# Patient Record
Sex: MALE | URBAN - METROPOLITAN AREA
[De-identification: names, ages, dates, MRNs, and addresses within clinical notes are randomized per-mention and may not be internally consistent; named-entity substitution may affect disease eponyms.]

---

## 2024-08-05 ENCOUNTER — APPOINTMENT (RX ONLY)
Dept: URBAN - METROPOLITAN AREA CLINIC 11 | Facility: CLINIC | Age: 29
Setting detail: DERMATOLOGY
End: 2024-08-05

## 2024-08-05 DIAGNOSIS — Z41.1 ENCOUNTER FOR COSMETIC SURGERY: ICD-10-CM

## 2024-08-05 DIAGNOSIS — Z72.0 TOBACCO USE: ICD-10-CM

## 2024-08-05 PROCEDURE — ? CONSULTATION - CHEST (COSMETIC)

## 2024-08-05 PROCEDURE — 99003: CPT

## 2024-08-05 PROCEDURE — ? CONSULTATION - ABDOMINOPLASTY

## 2024-08-05 PROCEDURE — ? SMOKING CESSATION COUNSELING

## 2024-08-05 ASSESSMENT — LOCATION ZONE DERM: LOCATION ZONE: TRUNK

## 2024-08-05 ASSESSMENT — LOCATION SIMPLE DESCRIPTION DERM
LOCATION SIMPLE: ABDOMEN
LOCATION SIMPLE: CHEST

## 2024-08-05 ASSESSMENT — LOCATION DETAILED DESCRIPTION DERM
LOCATION DETAILED: EPIGASTRIC SKIN
LOCATION DETAILED: LEFT LATERAL INFERIOR CHEST
LOCATION DETAILED: RIGHT LATERAL INFERIOR CHEST

## 2024-08-05 NOTE — PROCEDURE: CONSULTATION - ABDOMINOPLASTY
Other Plan: Extended Abdominoplasty
Detail Level: Simple
Consultation Charge $ (Use Numbers Only, No Text Please.): 100
Count Minor/Major Decisions Toward Mdm (Not Cosmetic)?: No

## 2024-08-05 NOTE — PROCEDURE: CONSULTATION - CHEST (COSMETIC)
Count Minor/Major Decisions Toward Mdm (Not Cosmetic)?: No
Other Plan - Left: modified lateral mastopexy
Detail Level: Simple

## 2024-10-21 ENCOUNTER — APPOINTMENT (RX ONLY)
Dept: URBAN - METROPOLITAN AREA CLINIC 11 | Facility: CLINIC | Age: 29
Setting detail: DERMATOLOGY
End: 2024-10-21

## 2024-10-21 DIAGNOSIS — Z41.1 ENCOUNTER FOR COSMETIC SURGERY: ICD-10-CM

## 2024-10-21 DIAGNOSIS — Z72.0 TOBACCO USE: ICD-10-CM

## 2024-10-21 PROCEDURE — ? CONSULTATION - CHEST (COSMETIC)

## 2024-10-21 PROCEDURE — ? CONSULTATION - ABDOMINOPLASTY

## 2024-10-21 PROCEDURE — ? SMOKING CESSATION COUNSELING

## 2024-10-21 PROCEDURE — 99005: CPT

## 2024-10-21 ASSESSMENT — LOCATION SIMPLE DESCRIPTION DERM
LOCATION SIMPLE: CHEST
LOCATION SIMPLE: ABDOMEN

## 2024-10-21 ASSESSMENT — LOCATION ZONE DERM: LOCATION ZONE: TRUNK

## 2024-10-21 ASSESSMENT — LOCATION DETAILED DESCRIPTION DERM
LOCATION DETAILED: EPIGASTRIC SKIN
LOCATION DETAILED: RIGHT LATERAL INFERIOR CHEST
LOCATION DETAILED: LEFT LATERAL INFERIOR CHEST

## 2024-10-21 NOTE — PROCEDURE: CONSULTATION - ABDOMINOPLASTY
Other Plan: Extended Abdominoplasty, possible circumferential  The issues of wound healing with Belt versus bunching of the sacral tissues have been discussed with the patient.
Detail Level: Simple
Count Minor/Major Decisions Toward Mdm (Not Cosmetic)?: No

## 2024-11-25 ENCOUNTER — APPOINTMENT (RX ONLY)
Dept: URBAN - METROPOLITAN AREA CLINIC 11 | Facility: CLINIC | Age: 29
Setting detail: DERMATOLOGY
End: 2024-11-25

## 2024-11-25 DIAGNOSIS — Z41.9 ENCOUNTER FOR PROCEDURE FOR PURPOSES OTHER THAN REMEDYING HEALTH STATE, UNSPECIFIED: ICD-10-CM

## 2024-11-25 PROCEDURE — Z1101: HCPCS

## 2024-11-25 PROCEDURE — 99004: CPT

## 2024-11-25 PROCEDURE — ? PRE-OP WORKLIST

## 2024-11-25 PROCEDURE — ? PRESCRIPTION

## 2024-11-25 RX ORDER — GABAPENTIN 300 MG/1
CAPSULE ORAL BID
Qty: 11 | Refills: 0 | Status: ERX | COMMUNITY
Start: 2024-11-25

## 2024-11-25 RX ORDER — CEPHALEXIN 500 MG/1
CAPSULE ORAL TID
Qty: 21 | Refills: 0 | Status: ERX | COMMUNITY
Start: 2024-11-25

## 2024-11-25 RX ORDER — MELOXICAM 7.5 MG/1
TABLET ORAL BID
Qty: 15 | Refills: 0 | Status: ERX | COMMUNITY
Start: 2024-11-25

## 2024-11-25 RX ORDER — DIAZEPAM 5 MG/1
TABLET ORAL AS DIRECTED
Qty: 15 | Refills: 0 | Status: ERX | COMMUNITY
Start: 2024-11-25

## 2024-11-25 RX ORDER — ONDANSETRON 8 MG/1
TABLET, ORALLY DISINTEGRATING ORAL
Qty: 15 | Refills: 0 | Status: ERX | COMMUNITY
Start: 2024-11-25

## 2024-11-25 RX ORDER — DOCUSATE SODIUM 100 MG/1
CAPSULE, LIQUID FILLED ORAL BID
Qty: 14 | Refills: 0 | Status: ERX | COMMUNITY
Start: 2024-11-25

## 2024-11-25 RX ORDER — OXYCODONE HYDROCHLORIDE 5 MG/1
TABLET ORAL
Qty: 20 | Refills: 0 | Status: ERX | COMMUNITY
Start: 2024-11-25

## 2024-11-25 RX ADMIN — DIAZEPAM: 5 TABLET ORAL at 00:00

## 2024-11-25 RX ADMIN — MELOXICAM: 7.5 TABLET ORAL at 00:00

## 2024-11-25 RX ADMIN — DOCUSATE SODIUM: 100 CAPSULE, LIQUID FILLED ORAL at 00:00

## 2024-11-25 RX ADMIN — GABAPENTIN: 300 CAPSULE ORAL at 00:00

## 2024-11-25 RX ADMIN — CEPHALEXIN: 500 CAPSULE ORAL at 00:00

## 2024-11-25 RX ADMIN — OXYCODONE HYDROCHLORIDE: 5 TABLET ORAL at 00:00

## 2024-11-25 RX ADMIN — ONDANSETRON: 8 TABLET, ORALLY DISINTEGRATING ORAL at 00:00

## 2024-11-25 ASSESSMENT — LOCATION ZONE DERM: LOCATION ZONE: TRUNK

## 2024-11-25 ASSESSMENT — LOCATION DETAILED DESCRIPTION DERM
LOCATION DETAILED: RIGHT INFERIOR LATERAL LOWER BACK
LOCATION DETAILED: PERIUMBILICAL SKIN
LOCATION DETAILED: RIGHT LATERAL INFERIOR CHEST
LOCATION DETAILED: LEFT INFERIOR LATERAL LOWER BACK
LOCATION DETAILED: LEFT LATERAL INFERIOR CHEST

## 2024-11-25 ASSESSMENT — LOCATION SIMPLE DESCRIPTION DERM
LOCATION SIMPLE: LEFT LOWER BACK
LOCATION SIMPLE: ABDOMEN
LOCATION SIMPLE: CHEST
LOCATION SIMPLE: RIGHT LOWER BACK

## 2024-11-25 NOTE — HPI: PREOPERATIVE APPOINTMENT
[email or web address]_arrangements_have_been_made
Has The Patient Completed Informed Consent?: is scheduled to complete informed consent documentation during this visit
Has The Patient Fulfilled Financial Responsibilities For Surgical Scheduling?: is scheduled to complete payment to fulfill financial responsibilities during this visit
Date Of Procedure?: 12/05/2024
Facility: [facility]
Surgeon: Dr. Hamm

## 2024-11-25 NOTE — PROCEDURE: PRE-OP WORKLIST
Detail Level: Simple
Photos Taken?: yes
Surgeon: Dr. Hamm
Surgery Scheduled: extended abdominoplasty with liposuction, modified lateral mastopexy with liposuction
Admission Status: outpatient
Date Of Surgery: 12/05/2024

## 2024-12-05 ENCOUNTER — APPOINTMENT (RX ONLY)
Dept: URBAN - METROPOLITAN AREA CLINIC 181 | Facility: CLINIC | Age: 29
Setting detail: DERMATOLOGY
End: 2024-12-05

## 2024-12-05 DIAGNOSIS — Z41.1 ENCOUNTER FOR COSMETIC SURGERY: ICD-10-CM

## 2024-12-05 PROCEDURE — 15847 EXC SKIN ABD ADD-ON: CPT

## 2024-12-05 PROCEDURE — ? SET GLOBAL PERIOD

## 2024-12-05 PROCEDURE — 19318 BREAST REDUCTION: CPT | Mod: 50

## 2024-12-05 ASSESSMENT — LOCATION ZONE DERM: LOCATION ZONE: TRUNK

## 2024-12-05 ASSESSMENT — LOCATION DETAILED DESCRIPTION DERM: LOCATION DETAILED: PERIUMBILICAL SKIN

## 2024-12-05 ASSESSMENT — LOCATION SIMPLE DESCRIPTION DERM: LOCATION SIMPLE: ABDOMEN

## 2024-12-05 NOTE — PROCEDURE: SET GLOBAL PERIOD
Detail Level: Detailed
Other Surgery Performed: liposcution  abdominoplasty
Date Of Surgery (Mm/Dd/Yyyy): 12/05/2024
Surgery Global Period: 0

## 2024-12-06 ENCOUNTER — APPOINTMENT (RX ONLY)
Dept: URBAN - METROPOLITAN AREA CLINIC 11 | Facility: CLINIC | Age: 29
Setting detail: DERMATOLOGY
End: 2024-12-06

## 2024-12-06 DIAGNOSIS — Z48.89 ENCOUNTER FOR OTHER SPECIFIED SURGICAL AFTERCARE: ICD-10-CM

## 2024-12-06 PROCEDURE — ? CODE 99024 - NO CHARGE POST-OP VISIT

## 2024-12-06 PROCEDURE — 99024 POSTOP FOLLOW-UP VISIT: CPT

## 2024-12-06 PROCEDURE — ? PHOTOS OBTAINED

## 2024-12-06 PROCEDURE — ? COUNSELING - POST-OP CHECK

## 2024-12-06 PROCEDURE — ? COUNSELING - POST-OP CHECK, LIPOSUCTION

## 2024-12-06 PROCEDURE — ? COUNSELING - POST-OP CHECK, ABDOMINOPLASTY

## 2024-12-06 ASSESSMENT — LOCATION DETAILED DESCRIPTION DERM
LOCATION DETAILED: PERIUMBILICAL SKIN
LOCATION DETAILED: LEFT LATERAL INFERIOR CHEST
LOCATION DETAILED: RIGHT LATERAL INFERIOR CHEST

## 2024-12-06 ASSESSMENT — LOCATION SIMPLE DESCRIPTION DERM
LOCATION SIMPLE: ABDOMEN
LOCATION SIMPLE: CHEST

## 2024-12-06 ASSESSMENT — LOCATION ZONE DERM: LOCATION ZONE: TRUNK

## 2024-12-06 NOTE — HPI: POST-OP CHECK, COSMETIC (EXTENDED)
What Best Describes The Level Of Symmetry? (Check All That Apply): good
How Severe Is Your Pain?: moderate
How Is Your Wound Healing?: fresh
Compared To The Last Evaluation, How Have The Findings Changed?: stable
Date Of Procedure: 12/5/2024

## 2024-12-06 NOTE — PROCEDURE: PHOTOS OBTAINED
Detail Level: Simple
Follow-Up Increment: 1
Photographed Locations: Photos were obtained.
Follow-Up Interval: week
Follow-Up For Additional Photos?: no

## 2024-12-06 NOTE — PROCEDURE: COUNSELING - POST-OP CHECK, ABDOMINOPLASTY
Add Postop Global No-Charge Code (37813)?: yes
Detail Level: Simple
Count Minor/Major Decisions Toward Mdm (Not Cosmetic)?: No

## 2024-12-06 NOTE — PROCEDURE: REASSURANCE
Information Provided: The examination is within normal limits today.
Follow-Up Interval: week
Follow-Up Increment: 1
Follow-Up: yes
Detail Level: Simple

## 2024-12-06 NOTE — PROCEDURE: COUNSELING - POST-OP CHECK, LIPOSUCTION
Detail Level: Simple
Add Postop Global No-Charge Code (52957)?: yes
Count Minor/Major Decisions Toward Mdm (Not Cosmetic)?: No

## 2024-12-09 RX ORDER — OXYCODONE HYDROCHLORIDE 5 MG/1
TABLET ORAL
Qty: 20 | Refills: 0 | Status: ERX

## 2024-12-09 RX ORDER — DIAZEPAM 5 MG/1
TABLET ORAL AS DIRECTED
Qty: 15 | Refills: 0 | Status: ERX

## 2024-12-12 ENCOUNTER — RX ONLY (RX ONLY)
Age: 29
End: 2024-12-12

## 2024-12-12 ENCOUNTER — APPOINTMENT (OUTPATIENT)
Dept: URBAN - METROPOLITAN AREA CLINIC 11 | Facility: CLINIC | Age: 29
Setting detail: DERMATOLOGY
End: 2024-12-12

## 2024-12-12 DIAGNOSIS — Z48.89 ENCOUNTER FOR OTHER SPECIFIED SURGICAL AFTERCARE: ICD-10-CM

## 2024-12-12 PROCEDURE — ? COUNSELING - POST-OP CHECK, ABDOMINOPLASTY

## 2024-12-12 PROCEDURE — 99024 POSTOP FOLLOW-UP VISIT: CPT

## 2024-12-12 PROCEDURE — ? COUNSELING - POST-OP CHECK

## 2024-12-12 PROCEDURE — ? DRAIN REMOVAL

## 2024-12-12 PROCEDURE — ? CODE 99024 - NO CHARGE POST-OP VISIT

## 2024-12-12 PROCEDURE — ? COUNSELING - POST-OP CHECK, LIPOSUCTION

## 2024-12-12 RX ORDER — DOXYCYCLINE HYCLATE 100 MG/1
1 TABLET TABLET, COATED ORAL TWICE A DAY
Qty: 14 | Refills: 0 | Status: ERX | COMMUNITY
Start: 2024-12-12

## 2024-12-12 ASSESSMENT — LOCATION DETAILED DESCRIPTION DERM
LOCATION DETAILED: LEFT LATERAL INFERIOR CHEST
LOCATION DETAILED: RIGHT LATERAL INFERIOR CHEST
LOCATION DETAILED: PERIUMBILICAL SKIN

## 2024-12-12 ASSESSMENT — LOCATION ZONE DERM: LOCATION ZONE: TRUNK

## 2024-12-12 ASSESSMENT — LOCATION SIMPLE DESCRIPTION DERM
LOCATION SIMPLE: CHEST
LOCATION SIMPLE: ABDOMEN

## 2024-12-12 NOTE — PROCEDURE: COUNSELING - POST-OP CHECK, LIPOSUCTION
Detail Level: Simple
Add Postop Global No-Charge Code (67935)?: yes
Count Minor/Major Decisions Toward Mdm (Not Cosmetic)?: No

## 2024-12-12 NOTE — HPI: POST-OP CHECK, COSMETIC (EXTENDED)
What Best Describes The Level Of Symmetry? (Check All That Apply): good
How Severe Is Your Pain?: moderate
How Is Your Wound Healing?: healing well
Compared To The Last Evaluation, How Have The Findings Changed?: improved
Date Of Procedure: 12/5/2024

## 2024-12-12 NOTE — PROCEDURE: COUNSELING - POST-OP CHECK, ABDOMINOPLASTY
Add Postop Global No-Charge Code ([id number])?: yes
Detail Level: Simple
Count Minor/Major Decisions Toward Mdm (Not Cosmetic)?: No

## 2024-12-13 ENCOUNTER — RX ONLY (RX ONLY)
Age: 29
End: 2024-12-13

## 2024-12-13 RX ORDER — OXYCODONE HYDROCHLORIDE 5 MG/1
TABLET ORAL
Qty: 15 | Refills: 0 | Status: ERX

## 2024-12-13 RX ORDER — DIAZEPAM 5 MG/1
TABLET ORAL
Qty: 15 | Refills: 0 | Status: ERX

## 2024-12-19 ENCOUNTER — APPOINTMENT (OUTPATIENT)
Dept: URBAN - METROPOLITAN AREA CLINIC 11 | Facility: CLINIC | Age: 29
Setting detail: DERMATOLOGY
End: 2024-12-19

## 2024-12-19 DIAGNOSIS — Z48.89 ENCOUNTER FOR OTHER SPECIFIED SURGICAL AFTERCARE: ICD-10-CM

## 2024-12-19 PROCEDURE — 99024 POSTOP FOLLOW-UP VISIT: CPT

## 2024-12-19 PROCEDURE — ? CODE 99024 - NO CHARGE POST-OP VISIT

## 2024-12-19 PROCEDURE — ? COUNSELING - POST-OP CHECK, ABDOMINOPLASTY

## 2024-12-19 PROCEDURE — ? COUNSELING - POST-OP CHECK, MASTOPEXY

## 2024-12-19 PROCEDURE — ? COUNSELING - POST-OP CHECK, LIPOSUCTION

## 2024-12-19 PROCEDURE — ? DRAIN REMOVAL

## 2024-12-19 PROCEDURE — ? COUNSELING - POST-OP CHECK

## 2024-12-19 ASSESSMENT — LOCATION SIMPLE DESCRIPTION DERM
LOCATION SIMPLE: CHEST
LOCATION SIMPLE: GROIN
LOCATION SIMPLE: ABDOMEN

## 2024-12-19 ASSESSMENT — LOCATION DETAILED DESCRIPTION DERM
LOCATION DETAILED: PERIUMBILICAL SKIN
LOCATION DETAILED: SUPRAPUBIC SKIN
LOCATION DETAILED: RIGHT LATERAL INFERIOR CHEST
LOCATION DETAILED: LEFT LATERAL INFERIOR CHEST

## 2024-12-19 ASSESSMENT — LOCATION ZONE DERM: LOCATION ZONE: TRUNK

## 2024-12-19 NOTE — PROCEDURE: COUNSELING - POST-OP CHECK, MASTOPEXY
Count Minor/Major Decisions Toward Mdm (Not Cosmetic)?: No
Add Postop Global No-Charge Code ([id number])?: yes
Detail Level: Simple

## 2024-12-19 NOTE — PROCEDURE: COUNSELING - POST-OP CHECK, ABDOMINOPLASTY
Add Postop Global No-Charge Code ([id number])?: yes
Detail Level: Simple
Count Minor/Major Decisions Toward Mdm (Not Cosmetic)?: No

## 2024-12-19 NOTE — PROCEDURE: COUNSELING - POST-OP CHECK, LIPOSUCTION
Detail Level: Simple
Add Postop Global No-Charge Code (12856)?: yes
Count Minor/Major Decisions Toward Mdm (Not Cosmetic)?: No

## 2024-12-23 ENCOUNTER — APPOINTMENT (OUTPATIENT)
Dept: URBAN - METROPOLITAN AREA CLINIC 11 | Facility: CLINIC | Age: 29
Setting detail: DERMATOLOGY
End: 2024-12-23

## 2024-12-23 DIAGNOSIS — Z48.89 ENCOUNTER FOR OTHER SPECIFIED SURGICAL AFTERCARE: ICD-10-CM

## 2024-12-23 PROCEDURE — ? COUNSELING - POST-OP CHECK, LIPOSUCTION

## 2024-12-23 PROCEDURE — ? COUNSELING - POST-OP CHECK

## 2024-12-23 PROCEDURE — ? COUNSELING - POST-OP CHECK, MASTOPEXY

## 2024-12-23 PROCEDURE — 99024 POSTOP FOLLOW-UP VISIT: CPT

## 2024-12-23 PROCEDURE — ? CODE 99024 - NO CHARGE POST-OP VISIT

## 2024-12-23 PROCEDURE — ? STAPLE REMOVAL

## 2024-12-23 PROCEDURE — ? COUNSELING - POST-OP CHECK, ABDOMINOPLASTY

## 2024-12-23 ASSESSMENT — LOCATION SIMPLE DESCRIPTION DERM
LOCATION SIMPLE: CHEST
LOCATION SIMPLE: ABDOMEN
LOCATION SIMPLE: RIGHT LOWER BACK
LOCATION SIMPLE: LEFT LOWER BACK

## 2024-12-23 ASSESSMENT — LOCATION DETAILED DESCRIPTION DERM
LOCATION DETAILED: RIGHT INFERIOR LATERAL LOWER BACK
LOCATION DETAILED: PERIUMBILICAL SKIN
LOCATION DETAILED: LEFT INFERIOR LATERAL LOWER BACK
LOCATION DETAILED: RIGHT LATERAL INFERIOR CHEST
LOCATION DETAILED: LEFT LATERAL INFERIOR CHEST

## 2024-12-23 ASSESSMENT — LOCATION ZONE DERM: LOCATION ZONE: TRUNK

## 2024-12-23 NOTE — PROCEDURE: COUNSELING - POST-OP CHECK, ABDOMINOPLASTY
Add Postop Global No-Charge Code (51434)?: yes
Detail Level: Simple
Count Minor/Major Decisions Toward Mdm (Not Cosmetic)?: No

## 2024-12-23 NOTE — PROCEDURE: COUNSELING - POST-OP CHECK, MASTOPEXY
Count Minor/Major Decisions Toward Mdm (Not Cosmetic)?: No
Add Postop Global No-Charge Code (51208)?: yes
Detail Level: Simple

## 2024-12-23 NOTE — PROCEDURE: COUNSELING - POST-OP CHECK, LIPOSUCTION
Detail Level: Simple
Add Postop Global No-Charge Code (67272)?: yes
Count Minor/Major Decisions Toward Mdm (Not Cosmetic)?: No

## 2024-12-23 NOTE — HPI: POST-OP CHECK, COSMETIC (EXTENDED)
What Best Describes The Level Of Symmetry? (Check All That Apply): fair
How Severe Is Your Pain?: mild
How Is Your Wound Healing?: healing well
Compared To The Last Evaluation, How Have The Findings Changed?: stable
Date Of Procedure: 12/05/2024
Additional History: patient reports mild drainage from nipples, white in color

## 2025-01-07 ENCOUNTER — RX ONLY (RX ONLY)
Age: 30
End: 2025-01-07

## 2025-01-07 RX ORDER — MUPIROCIN 20 MG/G
OINTMENT TOPICAL
Qty: 22 | Refills: 1 | Status: ERX | COMMUNITY
Start: 2025-01-07

## 2025-01-27 ENCOUNTER — APPOINTMENT (OUTPATIENT)
Dept: URBAN - METROPOLITAN AREA CLINIC 11 | Facility: CLINIC | Age: 30
Setting detail: DERMATOLOGY
End: 2025-01-27

## 2025-01-27 DIAGNOSIS — Z48.89 ENCOUNTER FOR OTHER SPECIFIED SURGICAL AFTERCARE: ICD-10-CM

## 2025-01-27 PROCEDURE — ? COUNSELING - POST-OP CHECK

## 2025-01-27 PROCEDURE — 99024 POSTOP FOLLOW-UP VISIT: CPT

## 2025-01-27 PROCEDURE — ? COUNSELING - POST-OP CHECK, ABDOMINOPLASTY

## 2025-01-27 PROCEDURE — ? COUNSELING - POST-OP CHECK, LIPOSUCTION

## 2025-01-27 PROCEDURE — ? COUNSELING - POST-OP CHECK, MASTOPEXY

## 2025-01-27 PROCEDURE — ? CODE 99024 - NO CHARGE POST-OP VISIT

## 2025-01-27 ASSESSMENT — LOCATION SIMPLE DESCRIPTION DERM
LOCATION SIMPLE: CHEST
LOCATION SIMPLE: ABDOMEN

## 2025-01-27 ASSESSMENT — LOCATION DETAILED DESCRIPTION DERM
LOCATION DETAILED: LEFT LATERAL INFERIOR CHEST
LOCATION DETAILED: PERIUMBILICAL SKIN
LOCATION DETAILED: RIGHT LATERAL INFERIOR CHEST

## 2025-01-27 ASSESSMENT — LOCATION ZONE DERM: LOCATION ZONE: TRUNK

## 2025-01-27 NOTE — PROCEDURE: COUNSELING - POST-OP CHECK, ABDOMINOPLASTY
Add Postop Global No-Charge Code (56317)?: yes
Detail Level: Simple
Count Minor/Major Decisions Toward Mdm (Not Cosmetic)?: No

## 2025-01-27 NOTE — PROCEDURE: COUNSELING - POST-OP CHECK, MASTOPEXY
Count Minor/Major Decisions Toward Mdm (Not Cosmetic)?: No
Add Postop Global No-Charge Code (90866)?: yes
Detail Level: Simple

## 2025-01-27 NOTE — PROCEDURE: COUNSELING - POST-OP CHECK, LIPOSUCTION
Detail Level: Simple
Add Postop Global No-Charge Code (89101)?: yes
Count Minor/Major Decisions Toward Mdm (Not Cosmetic)?: No

## 2025-01-27 NOTE — HPI: POST-OP CHECK, COSMETIC (EXTENDED)
How Severe Is Your Pain?: 0 out of 10
How Is Your Wound Healing?: healing well
Compared To The Last Evaluation, How Have The Findings Changed?: improved
What Is Your Overall Satisfaction Level With The Current Outcome?: satisfied
Date Of Procedure: 12/05/2024
Additional History: patient reports healing well

## 2025-03-03 ENCOUNTER — APPOINTMENT (OUTPATIENT)
Dept: URBAN - METROPOLITAN AREA CLINIC 11 | Facility: CLINIC | Age: 30
Setting detail: DERMATOLOGY
End: 2025-03-03

## 2025-03-03 DIAGNOSIS — Z48.89 ENCOUNTER FOR OTHER SPECIFIED SURGICAL AFTERCARE: ICD-10-CM

## 2025-03-03 PROCEDURE — ? COUNSELING - POST-OP CHECK

## 2025-03-03 PROCEDURE — ? CODE 99024 - NO CHARGE POST-OP VISIT

## 2025-03-03 PROCEDURE — ? COUNSELING - POST-OP CHECK, LIPOSUCTION

## 2025-03-03 PROCEDURE — ? COUNSELING - POST-OP CHECK, MASTOPEXY

## 2025-03-03 PROCEDURE — ? COUNSELING - POST-OP CHECK, ABDOMINOPLASTY

## 2025-03-03 PROCEDURE — 99024 POSTOP FOLLOW-UP VISIT: CPT

## 2025-03-03 ASSESSMENT — LOCATION DETAILED DESCRIPTION DERM
LOCATION DETAILED: RIGHT LATERAL INFERIOR CHEST
LOCATION DETAILED: PERIUMBILICAL SKIN
LOCATION DETAILED: LEFT LATERAL INFERIOR CHEST

## 2025-03-03 ASSESSMENT — LOCATION ZONE DERM: LOCATION ZONE: TRUNK

## 2025-03-03 ASSESSMENT — LOCATION SIMPLE DESCRIPTION DERM
LOCATION SIMPLE: CHEST
LOCATION SIMPLE: ABDOMEN

## 2025-03-03 NOTE — PROCEDURE: COUNSELING - POST-OP CHECK, MASTOPEXY
Count Minor/Major Decisions Toward Mdm (Not Cosmetic)?: No
Add Postop Global No-Charge Code (33249)?: yes
Detail Level: Simple

## 2025-03-03 NOTE — PROCEDURE: COUNSELING - POST-OP CHECK, LIPOSUCTION
Detail Level: Simple
Add Postop Global No-Charge Code (12920)?: yes
Count Minor/Major Decisions Toward Mdm (Not Cosmetic)?: No

## 2025-03-03 NOTE — PROCEDURE: COUNSELING - POST-OP CHECK, ABDOMINOPLASTY
Add Postop Global No-Charge Code (78622)?: yes
Detail Level: Simple
Count Minor/Major Decisions Toward Mdm (Not Cosmetic)?: No

## 2025-09-08 ENCOUNTER — APPOINTMENT (OUTPATIENT)
Dept: URBAN - METROPOLITAN AREA CLINIC 11 | Facility: CLINIC | Age: 30
Setting detail: DERMATOLOGY
End: 2025-09-08

## 2025-09-08 DIAGNOSIS — L90.5 SCAR CONDITIONS AND FIBROSIS OF SKIN: ICD-10-CM

## 2025-09-08 DIAGNOSIS — Z48.89 ENCOUNTER FOR OTHER SPECIFIED SURGICAL AFTERCARE: ICD-10-CM

## 2025-09-08 PROCEDURE — ? COUNSELING - POST-OP CHECK

## 2025-09-08 PROCEDURE — ? CODE 99024 - NO CHARGE POST-OP VISIT

## 2025-09-08 PROCEDURE — ? COUNSELING - POST-OP CHECK, MASTOPEXY

## 2025-09-08 PROCEDURE — ? COUNSELING - SCAR

## 2025-09-08 PROCEDURE — ? PRESCRIPTION

## 2025-09-08 PROCEDURE — ? COUNSELING - POST-OP CHECK, ABDOMINOPLASTY

## 2025-09-08 PROCEDURE — ? COUNSELING - POST-OP CHECK, LIPOSUCTION

## 2025-09-08 RX ORDER — TRIAMCINOLONE ACETONIDE 1 MG/G
CREAM TOPICAL BID
Qty: 45 | Refills: 1 | Status: ERX | COMMUNITY
Start: 2025-09-08

## 2025-09-08 RX ADMIN — TRIAMCINOLONE ACETONIDE: 1 CREAM TOPICAL at 00:00

## 2025-09-08 ASSESSMENT — LOCATION SIMPLE DESCRIPTION DERM
LOCATION SIMPLE: RIGHT CHEST
LOCATION SIMPLE: ABDOMEN
LOCATION SIMPLE: CHEST
LOCATION SIMPLE: LEFT CHEST

## 2025-09-08 ASSESSMENT — LOCATION DETAILED DESCRIPTION DERM
LOCATION DETAILED: RIGHT LATERAL INFERIOR CHEST
LOCATION DETAILED: RIGHT AREOLA
LOCATION DETAILED: LEFT AREOLA
LOCATION DETAILED: PERIUMBILICAL SKIN
LOCATION DETAILED: LEFT LATERAL INFERIOR CHEST

## 2025-09-08 ASSESSMENT — SCAR ASSESSEMENT OVERALL: SCAR ASSESSMENT: 1.5 (FLAT SCAR, ERYTHEMA ONLY)

## 2025-09-08 ASSESSMENT — LOCATION ZONE DERM
LOCATION ZONE: TRUNK
LOCATION ZONE: TRUNK